# Patient Record
Sex: FEMALE | Race: WHITE | NOT HISPANIC OR LATINO | URBAN - METROPOLITAN AREA
[De-identification: names, ages, dates, MRNs, and addresses within clinical notes are randomized per-mention and may not be internally consistent; named-entity substitution may affect disease eponyms.]

---

## 2018-05-07 ENCOUNTER — OFFICE VISIT (OUTPATIENT)
Dept: OPHTHALMOLOGY | Facility: CLINIC | Age: 63
End: 2018-05-07
Payer: COMMERCIAL

## 2018-05-07 DIAGNOSIS — E07.9 THYROID EYE DISEASE: ICD-10-CM

## 2018-05-07 DIAGNOSIS — H57.12 ORBITAL PAIN, LEFT: ICD-10-CM

## 2018-05-07 DIAGNOSIS — H57.89 THYROID EYE DISEASE: ICD-10-CM

## 2018-05-07 DIAGNOSIS — H57.12 ORBITAL PAIN, LEFT: Primary | ICD-10-CM

## 2018-05-07 ASSESSMENT — VISUAL ACUITY
OD_CC+: -
OD_CC: 20/20
CORRECTION_TYPE: GLASSES
OS_CC: 20/25
METHOD: SNELLEN - LINEAR
OS_CC+: +

## 2018-05-07 ASSESSMENT — SLIT LAMP EXAM - LIDS
COMMENTS: MILD UPPER LID RETRACTION
COMMENTS: UPPER LID RETRACTION

## 2018-05-07 ASSESSMENT — TONOMETRY
OD_IOP_MMHG: 17
IOP_METHOD: ICARE
OS_IOP_MMHG: 17

## 2018-05-07 NOTE — MR AVS SNAPSHOT
After Visit Summary   2018    Nuria Marie    MRN: [id number]           Patient Information     Date Of Birth          1955        Visit Information        Provider Department      2018 1:45 PM Bruce Rose MD [facility] Ophthalmology        Today's Diagnoses     Orbital pain, left    -  1    Thyroid eye disease           Follow-ups after your visit        Future tests that were ordered for you today     Open Future Orders        Priority Expected Expires Ordered    CT Temporal Orbital Sella w/o & w Cont Routine  2019    Thyroid stimulating immunoglobulin Routine  2018            Who to contact     Please call your clinic at [phone] to:    Ask questions about your health    Make or cancel appointments    Discuss your medicines    Learn about your test results    Speak to your doctor            Additional Information About Your Visit        MyChart Information     Arcaris is an electronic gateway that provides easy, online access to your medical records. With Arcar[employer], you can request a clinic appointment, read your test results, renew a prescription or communicate with your care team.     To sign up for Ar[employer] visit the website at [email or web address]   You will be asked to enter the access code listed below, as well as some personal information. Please follow the directions to create your username and password.     Your access code is: [email or web address]  Expires: 2018  3:33 PM     Your access code will  in 90 days. If you need help or a new code, please contact your [facility] or call [phone] for assistance.        Care EveryWhere ID     This is your Care EveryWhere ID. This could be used by other organizations to access your [place] medical records  [id number]         Blood Pressure from Last 3 Encounters:   No data found for BP    Weight from Last 3 Encounters:   No data found for Wt                Primary Care Provider Office Phone # Fax #    Lexi Gilliland [phone]       [facility] [address] [address]        Equal Access to Services     LORENA LA : Hadii aad ku hadceliao Sotylerali, waaxda luqadaha, qaybta kaalmada adediamondda, elda brady sadia jensen. So [facility] [phone].    ATENCIÓN: Si habla español, tiene a abraham disposición servicios gratuitos de asistencia lingüística. Llame al [phone].    We comply with applicable federal civil rights laws and [place] laws. We do not discriminate on the basis of race, color, national origin, age, disability, sex, sexual orientation, or gender identity.            Thank you!     Thank you for choosing [facility] OPHTHALMOLOGY  for your care. Our goal is always to provide you with excellent care. Hearing back from our patients is one way we can continue to improve our services. Please take a few minutes to complete the written survey that you may receive in the mail after your visit with us. Thank you!             Your Updated Medication List - Protect others around you: Learn how to safely use, store and throw away your medicines at www.disposemymeds.org.          This list is accurate as of 5/7/18  3:33 PM.  Always use your most recent med list.                   Brand Name Dispense Instructions for use Diagnosis    ASTELIN NA           LEVOTHROID PO           OMEPRAZOLE PO           SUDAFED 12 HOUR PO           SULINDAC PO           VITAMIN D (CHOLECALCIFEROL) PO      Take by mouth daily

## 2018-05-07 NOTE — NURSING NOTE
Chief Complaints and History of Present Illnesses   Patient presents with     Consult For     orbital pain     HPI    Affected eye(s):  Left   Symptoms:     No blurred vision      Frequency:  Intermittent       Do you have eye pain now?:  Yes   Location:  OS   Pain Level:  Moderate Pain (5), Moderate Pain (4)   Pain Frequency:  Intermittent   Pain Characteristics:  Stabbing      Comments:  Off and on pain with abduction with left eye. Seems to be getting worse. Hurt last night when putting pressure on globe.  Pulling sensation. Vision seems stable.    Анна Low COT 2:30 PM May 7, 2018

## 2018-05-07 NOTE — PROGRESS NOTES
Chief Complaints and History of Present Illnesses   Patient presents with     Consult For     orbital pain     Here for evaluation of left orbital pain.  This has been going on for a few months but seems to be much worse lately.  She woke up from sleep on Friday with pain.  It is worse on abduction.  She has a history of thyroid disease treated with TEMPLETON in the 1980s.  She had mild thyroid eye disease.       Nuria Marie is a 62 year old female with the following diagnoses:   1. Orbital pain, left    2. Thyroid eye disease       Left orbital pain with a history of tyroid eye disease and bilateral upper lid retraction  -obtain CT orbits and TSI             Sofia Calero MD  Ophthalmic Plastic and Reconstructive Surgery Fellow    Attending Physician Attestation:  Complete documentation of historical and exam elements from today's encounter can be found in the full encounter summary report (not reduplicated in this progress note).  I personally obtained the chief complaint(s) and history of present illness.  I confirmed and edited as necessary the review of systems, past medical/surgical history, family history, social history, and examination findings as documented by others; and I examined the patient myself.  I personally reviewed the relevant tests, images, and reports as documented above.  I formulated and edited as necessary the assessment and plan and discussed the findings and management plan with the patient and family. I personally reviewed the ophthalmic test(s) associated with this encounter, agree with the interpretation(s) as documented by the resident/fellow, and have edited the corresponding report(s) as necessary.   -Bruce Rose MD

## 2018-05-09 LAB — TSI SER-ACNC: <1 TSI INDEX

## 2018-05-10 ENCOUNTER — RADIANT APPOINTMENT (OUTPATIENT)
Dept: CT IMAGING | Facility: CLINIC | Age: 63
End: 2018-05-10
Attending: OPHTHALMOLOGY
Payer: COMMERCIAL

## 2018-05-10 DIAGNOSIS — H57.12 ORBITAL PAIN, LEFT: ICD-10-CM

## 2018-05-10 RX ORDER — IOPAMIDOL 755 MG/ML
100 INJECTION, SOLUTION INTRAVASCULAR ONCE
Status: COMPLETED | OUTPATIENT
Start: 2018-05-10 | End: 2018-05-10

## 2018-05-10 RX ADMIN — IOPAMIDOL 75 ML: 755 INJECTION, SOLUTION INTRAVASCULAR at 15:21

## 2018-05-10 NOTE — DISCHARGE INSTRUCTIONS

## 2024-02-13 ENCOUNTER — PATIENT OUTREACH (OUTPATIENT)
Dept: OTOLARYNGOLOGY | Facility: CLINIC | Age: 69
End: 2024-02-13

## 2024-02-13 DIAGNOSIS — H93.19 TINNITUS, UNSPECIFIED LATERALITY: Primary | ICD-10-CM

## 2024-02-13 NOTE — PROGRESS NOTES
Patient contacted provider as she had developed tinnitus. Harriet reerred patient to see NP in clinic. NP will see patient and requested audiogram prior to appointment. Writer placed order and reached out to coordinators to coordinate appointments. Natividad Enamorado RN on 2/13/2024 at 9:04 AM

## 2024-02-13 NOTE — TELEPHONE ENCOUNTER
FUTURE VISIT INFORMATION      FUTURE VISIT INFORMATION:  Date: 2/27/24  Time: 12pm  Location[place] [facility]  REFERRAL INFORMATION:  Referring provider:    Referring providers clinic:    Reason for visit/diagnosis  Please schedule this patient with Lnidsay VIRK on 2.27 noon with audio prior     RECORDS REQUESTED FROM:       No rec

## 2024-02-27 ENCOUNTER — PRE VISIT (OUTPATIENT)
Dept: OTOLARYNGOLOGY | Facility: CLINIC | Age: 69
End: 2024-02-27

## 2024-02-27 ENCOUNTER — OFFICE VISIT (OUTPATIENT)
Dept: OTOLARYNGOLOGY | Facility: CLINIC | Age: 69
End: 2024-02-27
Attending: REGISTERED NURSE
Payer: COMMERCIAL

## 2024-02-27 ENCOUNTER — OFFICE VISIT (OUTPATIENT)
Dept: AUDIOLOGY | Facility: CLINIC | Age: 69
End: 2024-02-27
Attending: REGISTERED NURSE
Payer: COMMERCIAL

## 2024-02-27 VITALS
BODY MASS INDEX: 27.16 KG/M2 | OXYGEN SATURATION: 100 % | SYSTOLIC BLOOD PRESSURE: 136 MMHG | WEIGHT: 147.6 LBS | DIASTOLIC BLOOD PRESSURE: 85 MMHG | HEART RATE: 75 BPM | HEIGHT: 62 IN

## 2024-02-27 DIAGNOSIS — H90.3 SENSORINEURAL HEARING LOSS, BILATERAL: Primary | ICD-10-CM

## 2024-02-27 DIAGNOSIS — H93.13 TINNITUS, BILATERAL: Primary | ICD-10-CM

## 2024-02-27 DIAGNOSIS — H91.93 HIGH FREQUENCY HEARING LOSS, BILATERAL: ICD-10-CM

## 2024-02-27 PROCEDURE — 92550 TYMPANOMETRY & REFLEX THRESH: CPT | Performed by: AUDIOLOGIST

## 2024-02-27 PROCEDURE — 92557 COMPREHENSIVE HEARING TEST: CPT | Performed by: AUDIOLOGIST

## 2024-02-27 PROCEDURE — 99203 OFFICE O/P NEW LOW 30 MIN: CPT | Performed by: REGISTERED NURSE

## 2024-02-27 RX ORDER — ESTRADIOL 2 MG/1
SYSTEM VAGINAL
COMMUNITY
Start: 2023-08-17

## 2024-02-27 RX ORDER — CONJUGATED ESTROGENS 0.62 MG/G
CREAM VAGINAL
COMMUNITY
Start: 2024-01-09

## 2024-02-27 RX ORDER — NITROFURANTOIN 25; 75 MG/1; MG/1
CAPSULE ORAL
COMMUNITY
Start: 2023-10-11

## 2024-02-27 ASSESSMENT — PAIN SCALES - GENERAL: PAINLEVEL: NO PAIN (0)

## 2024-02-27 NOTE — LETTER
2/27/2024       RE: Nuria Marie  [address] [facility] [address]     Dear Colleague,    Thank you for referring your patient, Nuria Marie, to the [facility] [place] at [facility]. Please see a copy of my visit note below.      Otolaryngology Clinic  February 27, 2024    Chief Complaint:   Bilateral tinnitus       History of Present Illness:   Nuria Marie is a 68 year old female who presents today for evaluation of bilateral tinnitus.  Patient reports longstanding history of bilateral tinnitus.  Reports that tinnitus seem to first come and go but has remained constant more recently.  Reports tinnitus as a constant high-pitched ringing that does not pulsate.  Sounds like cicadas.  Most noticeable at night when trying to fall asleep.  Patient does wear earplugs due to 's loud CPAP machine.  Patient denies any otalgia, otorrhea, dizziness,  hearing loss, facial numbness/weakness, history of frequent ear infections, or ear surgeries.  Patient also mentions recent change in thyroid dosage.      Past Medical History:  Past Medical History:   Diagnosis Date    Graves disease        Past Surgical History:  No past surgical history on file.    Medications:  Current Outpatient Medications   Medication Sig Dispense Refill    ESTRING 7.5 MCG/24HR vaginal ring INSERT 1 RING IN THE VAGINA ONCE EVERY 3 MONTH      nitroFURantoin macrocrystal-monohydrate (MACROBID) 100 MG capsule TAKE 1 CAPSULE BY MOUTH AFTER INTERCOURSE FOR UTI PREVENTION      PREMARIN 0.625 MG/GM vaginal cream APPLY SMALL AMOUNT WITH FINGER TIP TO URETHRA 1-3 TIMES A WEEK AS NEEDED.      Azelastine HCl (ASTELIN NA)       Levothyroxine Sodium (LEVOTHROID PO)       OMEPRAZOLE PO       Pseudoephedrine HCl (SUDAFED 12 HOUR PO)       SULINDAC PO       VITAMIN D, CHOLECALCIFEROL, PO Take by mouth daily         Allergies:  Allergies   Allergen Reactions     "Chocolate     Codeine     Penicillins     Tape [Adhesive Tape]     Other (Do Not Use) Rash     Adhesives/Paper Tape - \"Rips my skin off\"        Social History:  Social History     Tobacco Use    Smoking status: Never    Smokeless tobacco: Never       ROS: 10 point ROS neg other than the symptoms noted above in the HPI.    Physical Exam:    /85 (BP Location: Right arm, Patient Position: Sitting, Cuff Size: Adult Regular)   Pulse 75   Ht 1.562 m (5' 1.5\")   Wt 67 kg (147 lb 9.6 oz)   SpO2 100%   BMI 27.44 kg/m       Constitutional:  The patient was unaccompanied, well-groomed, and in no acute distress.     Skin: Normal:  warm and pink without rash    Neurologic: Alert and oriented x 3.  Voice normal.    Psychiatric: The patient's affect was calm, cooperative, and appropriate.     Communication:  Normal; communicates verbally, normal voice quality.    Respiratory: Breathing comfortably without stridor or exertion of accessory muscles.    Ears: Pinnae and tragus non-tender.  EAC's and TM's were clear.        Audiogram: 2/27/2024 - data independently reviewed  Normal hearing through 6000 Hz with steeply sloping to moderately severe sensorineural hearing loss at 8000 Hz bilaterally  % at 50 dB bilaterally  Acoustic Reflexes: Present in all conditions  Tympanograms: type A bilaterally    Assessment and Plan:  1. Tinnitus, bilateral  Patient with history of bilateral tinnitus.  Reviewed audiogram today with patient which shows sensorineural hearing loss at 8000 Hz bilaterally. Normal in all other frequencies.  Explained to patient that tinnitus is a central process, meaning that it is a brain generated noise but can commonly be caused by hearing loss.  When people lose hearing, the brain tries to make up for this loss of these frequencies by making its own noise. Suspect that patient's tinnitus may be related to this hearing loss Reviewed other factors that can contribute to tinnitus including stress, " anxiety, lack of sleep, and neck/muscle tension. Changes in TSH levels can also contribute to tinnitus symptoms.     Explained to patient that there is no specific medication or procedure that can eliminate tinnitus, however, there are evidence-based management strategies that can be used to improve symptoms.  Discussed management strategies with patient including tinnitus masking and cognitive behavioral approaches.  Recommend that, if patient finds quiet environments most bothersome, patient can use a noise machine and/or fan as the tinnitus after to distract the brain from wanting to make tinnitus noise.     Patient is not a hearing aid candidate at this time.  Recommend patient return to clinic in about 2 years for recheck of hearing.  Sooner for any sudden changes in hearing or worsening of tinnitus symptoms.     Lindsay Jiménez DNP, APRN, CNP  Otolaryngology  Head & Neck Surgery  [phone]    30 minutes spent by me on the date of the encounter doing chart review, history and exam, documentation and further activities per the note

## 2024-02-27 NOTE — PROGRESS NOTES
"  Otolaryngology Clinic  February 27, 2024    Chief Complaint:   Bilateral tinnitus       History of Present Illness:   Nuria Marie is a 68 year old female who presents today for evaluation of bilateral tinnitus.  Patient reports longstanding history of bilateral tinnitus.  Reports that tinnitus seem to first come and go but has remained constant more recently.  Reports tinnitus as a constant high-pitched ringing that does not pulsate.  Sounds like cicadas.  Most noticeable at night when trying to fall asleep.  Patient does wear earplugs due to 's loud CPAP machine.  Patient denies any otalgia, otorrhea, dizziness,  hearing loss, facial numbness/weakness, history of frequent ear infections, or ear surgeries.  Patient also mentions recent change in thyroid dosage.      Past Medical History:  Past Medical History:   Diagnosis Date    Graves disease        Past Surgical History:  No past surgical history on file.    Medications:  Current Outpatient Medications   Medication Sig Dispense Refill    ESTRING 7.5 MCG/24HR vaginal ring INSERT 1 RING IN THE VAGINA ONCE EVERY 3 MONTH      nitroFURantoin macrocrystal-monohydrate (MACROBID) 100 MG capsule TAKE 1 CAPSULE BY MOUTH AFTER INTERCOURSE FOR UTI PREVENTION      PREMARIN 0.625 MG/GM vaginal cream APPLY SMALL AMOUNT WITH FINGER TIP TO URETHRA 1-3 TIMES A WEEK AS NEEDED.      Azelastine HCl (ASTELIN NA)       Levothyroxine Sodium (LEVOTHROID PO)       OMEPRAZOLE PO       Pseudoephedrine HCl (SUDAFED 12 HOUR PO)       SULINDAC PO       VITAMIN D, CHOLECALCIFEROL, PO Take by mouth daily         Allergies:  Allergies   Allergen Reactions    Chocolate     Codeine     Penicillins     Tape [Adhesive Tape]     Other (Do Not Use) Rash     Adhesives/Paper Tape - \"Rips my skin off\"        Social History:  Social History     Tobacco Use    Smoking status: Never    Smokeless tobacco: Never       ROS: 10 point ROS neg other than the symptoms noted above in the HPI.    Physical " "Exam:    /85 (BP Location: Right arm, Patient Position: Sitting, Cuff Size: Adult Regular)   Pulse 75   Ht 1.562 m (5' 1.5\")   Wt 67 kg (147 lb 9.6 oz)   SpO2 100%   BMI 27.44 kg/m       Constitutional:  The patient was unaccompanied, well-groomed, and in no acute distress.     Skin: Normal:  warm and pink without rash    Neurologic: Alert and oriented x 3.  Voice normal.    Psychiatric: The patient's affect was calm, cooperative, and appropriate.     Communication:  Normal; communicates verbally, normal voice quality.    Respiratory: Breathing comfortably without stridor or exertion of accessory muscles.    Ears: Pinnae and tragus non-tender.  EAC's and TM's were clear.        Audiogram: 2/27/2024 - data independently reviewed  Normal hearing through 6000 Hz with steeply sloping to moderately severe sensorineural hearing loss at 8000 Hz bilaterally  % at 50 dB bilaterally  Acoustic Reflexes: Present in all conditions  Tympanograms: type A bilaterally    Assessment and Plan:  1. Tinnitus, bilateral  Patient with history of bilateral tinnitus.  Reviewed audiogram today with patient which shows sensorineural hearing loss at 8000 Hz bilaterally. Normal in all other frequencies.  Explained to patient that tinnitus is a central process, meaning that it is a brain generated noise but can commonly be caused by hearing loss.  When people lose hearing, the brain tries to make up for this loss of these frequencies by making its own noise. Suspect that patient's tinnitus may be related to this hearing loss Reviewed other factors that can contribute to tinnitus including stress, anxiety, lack of sleep, and neck/muscle tension. Changes in TSH levels can also contribute to tinnitus symptoms.     Explained to patient that there is no specific medication or procedure that can eliminate tinnitus, however, there are evidence-based management strategies that can be used to improve symptoms.  Discussed management " strategies with patient including tinnitus masking and cognitive behavioral approaches.  Recommend that, if patient finds quiet environments most bothersome, patient can use a noise machine and/or fan as the tinnitus after to distract the brain from wanting to make tinnitus noise.     Patient is not a hearing aid candidate at this time.  Recommend patient return to clinic in about 2 years for recheck of hearing.  Sooner for any sudden changes in hearing or worsening of tinnitus symptoms.     Lindsay Jiménez DNP, APRN, CNP  Otolaryngology  Head & Neck Surgery  [phone]    30 minutes spent by me on the date of the encounter doing chart review, history and exam, documentation and further activities per the note

## 2024-02-27 NOTE — NURSING NOTE
"Chief Complaint   Patient presents with    Consult   Blood pressure 136/85, pulse 75, height 1.562 m (5' 1.5\"), weight 67 kg (147 lb 9.6 oz), SpO2 100%. Ramon Jara, EMT    "

## 2024-05-25 ENCOUNTER — HEALTH MAINTENANCE LETTER (OUTPATIENT)
Age: 69
End: 2024-05-25

## 2025-06-14 ENCOUNTER — HEALTH MAINTENANCE LETTER (OUTPATIENT)
Age: 70
End: 2025-06-14